# Patient Record
Sex: FEMALE | Race: WHITE | NOT HISPANIC OR LATINO
[De-identification: names, ages, dates, MRNs, and addresses within clinical notes are randomized per-mention and may not be internally consistent; named-entity substitution may affect disease eponyms.]

---

## 2017-09-28 PROBLEM — Z00.00 ENCOUNTER FOR PREVENTIVE HEALTH EXAMINATION: Status: ACTIVE | Noted: 2017-09-28

## 2020-11-28 ENCOUNTER — TRANSCRIPTION ENCOUNTER (OUTPATIENT)
Age: 55
End: 2020-11-28

## 2020-12-12 ENCOUNTER — TRANSCRIPTION ENCOUNTER (OUTPATIENT)
Age: 55
End: 2020-12-12

## 2021-11-18 PROBLEM — Z00.00 ENCOUNTER FOR PREVENTIVE HEALTH EXAMINATION: Noted: 2021-11-18

## 2021-11-19 ENCOUNTER — APPOINTMENT (OUTPATIENT)
Dept: OTOLARYNGOLOGY | Facility: CLINIC | Age: 56
End: 2021-11-19
Payer: COMMERCIAL

## 2021-11-19 VITALS
SYSTOLIC BLOOD PRESSURE: 160 MMHG | BODY MASS INDEX: 27.38 KG/M2 | OXYGEN SATURATION: 97 % | HEIGHT: 61 IN | DIASTOLIC BLOOD PRESSURE: 80 MMHG | WEIGHT: 145 LBS | HEART RATE: 77 BPM

## 2021-11-19 PROCEDURE — 99203 OFFICE O/P NEW LOW 30 MIN: CPT | Mod: 25

## 2021-11-19 PROCEDURE — 69210 REMOVE IMPACTED EAR WAX UNI: CPT | Mod: RT

## 2021-11-19 NOTE — PHYSICAL EXAM
[Binocular Microscopic Exam] : Binocular microscopic exam was performed [FreeTextEntry8] : Full of cerumen.  Removed.  Granuloma see deep to wax.  TM not visualized [FreeTextEntry9] : Mid canl web present.  Granuloma seen deep to the web.  TM not visible. [Midline] : trachea located in midline position [Normal] : no rashes

## 2021-11-19 NOTE — ASSESSMENT
[FreeTextEntry1] : Pt with granulomatous lesions of both ear canals, possibly indicative of an underlying granulomatous disorder. \par \par CT temporal bones ordered.  Pt to see Otology team for additional management.

## 2021-11-19 NOTE — CONSULT LETTER
[Dear  ___] : Dear  [unfilled], [Consult Letter:] : I had the pleasure of evaluating your patient, [unfilled]. [Please see my note below.] : Please see my note below. [Consult Closing:] : Thank you very much for allowing me to participate in the care of this patient.  If you have any questions, please do not hesitate to contact me. [Sincerely,] : Sincerely, [FreeTextEntry2] : Alexander Castillo MD [FreeTextEntry3] : Bunny Doty MD, FACS\par Chief of Otolaryngology [facility]\par  - Dept. of Otolaryngology\par [facility] [employer]\par \par

## 2021-11-19 NOTE — REASON FOR VISIT
[Initial Consultation] : an initial consultation for [FreeTextEntry2] : hearing loss and itchy ears.

## 2021-11-19 NOTE — HISTORY OF PRESENT ILLNESS
[de-identified] : 57 y/o F with a 5 year h/o wax buildup and ear infections.  She has been treated with Ciprodex.  She developed a film on her ear drum that she had removed.  She was later told that she had scar tissue on her ear drum.  She saw another ENT who gave her Acetasol.  It burned her ear a lot.  \par \par Today she is experiencing hearing loss, itchy ears, and pressure sensations in her ears that make her feel like she needs to pop her ears.

## 2021-11-19 NOTE — REVIEW OF SYSTEMS
[Seasonal Allergies] : seasonal allergies [Ear Pain] : ear pain [Ear Itch] : ear itch [Recurrent Ear Infections] : recurrent ear infections [Ear Drainage] : ear drainage [Discolored Nasal Discharge] : discolored nasal discharge [Throat Dryness] : throat dryness [Throat Itching] : throat itching [Negative] : Heme/Lymph

## 2021-11-23 ENCOUNTER — RESULT REVIEW (OUTPATIENT)
Age: 56
End: 2021-11-23

## 2021-11-23 ENCOUNTER — NON-APPOINTMENT (OUTPATIENT)
Age: 56
End: 2021-11-23

## 2021-11-26 ENCOUNTER — APPOINTMENT (OUTPATIENT)
Dept: CT IMAGING | Facility: CLINIC | Age: 56
End: 2021-11-26
Payer: COMMERCIAL

## 2021-11-26 ENCOUNTER — OUTPATIENT (OUTPATIENT)
Dept: OUTPATIENT SERVICES | Facility: HOSPITAL | Age: 56
LOS: 1 days | End: 2021-11-26
Payer: COMMERCIAL

## 2021-11-26 DIAGNOSIS — H93.8X3 OTHER SPECIFIED DISORDERS OF EAR, BILATERAL: ICD-10-CM

## 2021-11-26 PROCEDURE — 70480 CT ORBIT/EAR/FOSSA W/O DYE: CPT

## 2021-11-26 PROCEDURE — 70480 CT ORBIT/EAR/FOSSA W/O DYE: CPT | Mod: 26

## 2021-12-02 ENCOUNTER — APPOINTMENT (OUTPATIENT)
Dept: OTOLARYNGOLOGY | Facility: CLINIC | Age: 56
End: 2021-12-02
Payer: COMMERCIAL

## 2021-12-02 VITALS
WEIGHT: 145 LBS | BODY MASS INDEX: 27.38 KG/M2 | HEIGHT: 61 IN | TEMPERATURE: 98.1 F | HEART RATE: 84 BPM | DIASTOLIC BLOOD PRESSURE: 87 MMHG | SYSTOLIC BLOOD PRESSURE: 168 MMHG

## 2021-12-02 PROCEDURE — 69210 REMOVE IMPACTED EAR WAX UNI: CPT

## 2021-12-02 PROCEDURE — 99214 OFFICE O/P EST MOD 30 MIN: CPT | Mod: 25

## 2021-12-02 NOTE — END OF VISIT
[FreeTextEntry3] : I personally saw and examined NASEEM FAUSTIN in detail. I spoke to ABHINAV Le regarding the assessment and plan of care. I reviewed the above assessment and plan of care, and agree. I have made changes in changes in the body of the note where appropriate.I personally reviewed the HPI, PMH, FH, SH, ROS and medications/allergies. I have spoken to ABHINAV Le regarding the history and have personally determined the assessment and plan of care, and documented this myself. I was present and participated in all key portions of the encounter and all procedures noted above. I have made changes in the body of the note where appropriate.\par \par Attesting Faculty: See Attending Signature Below \par \par \par  [Time Spent: ___ minutes] : I have spent [unfilled] minutes of time on the encounter.

## 2021-12-02 NOTE — PHYSICAL EXAM
[Midline] : trachea located in midline position [de-identified] : bilat wax [de-identified] : bilat thickened TM w/ granulation tissue [Normal] : no rashes

## 2021-12-02 NOTE — ASSESSMENT
[FreeTextEntry1] : Patient referred by  for granulomatous  lesions on both ears. She complains of decreased hearing\par \par Bilat Chronic Myringitis \par -wax cleaned from both ears \par -Rx: Medrol dose pack and Blephamide drops \par -H2O precautions reviewed \par -CT scan reviewed \par Poss bx in OR\par f/u 10 days or prn

## 2021-12-02 NOTE — HISTORY OF PRESENT ILLNESS
[No] : patient does not have a  history of radiation therapy [de-identified] : 55 yo female\par Patient referred by  for granulomatous lesions of both ear canals. Pt has hx of wax buildip and ear infections. She was treated with Ciprodex then developed a film on her ear drum that has been removed by a different ENT. She was also told that she has scar tissue on both ear drums. She is also complains hearing has decreased, she cant hear well. No other modifying factors\par Uses Q-tips\par h/o lichen planus in oral cavity\par \par  [Ear Fullness] : ear fullness [Hearing Loss] : hearing loss [Otalgia] : otalgia [Recurrent Otitis Media] : no recurrent otitis media [Otitis Media with Effusion] : no otitis media with effusion [Eustachian Tube Dysfunction] : no eustachian tube dysfunction [Cholesteatoma] : no cholesteatoma [Early Onset Hearing Loss] : no early onset hearing loss [Stroke] : no stroke [Allergic Rhinitis] : no allergic rhinitis [Adenoidectomy] : no adenoidectomy [Allergies] : no allergies [Asthma] : no asthma [Hyperthyroidism] : no hyperthyroidism [Sialadenitis] : no sialadenitis [Hodgkin Disease] : no hodgkin disease [Non-Hodgkin Lymphoma] : no non-hodgkin lymphoma [None] : No risk factors have been identified. [Graves Disease] : no graves disease [Thyroid Cancer] : no thyroid cancer

## 2021-12-16 ENCOUNTER — APPOINTMENT (OUTPATIENT)
Dept: OTOLARYNGOLOGY | Facility: CLINIC | Age: 56
End: 2021-12-16
Payer: COMMERCIAL

## 2021-12-16 VITALS
BODY MASS INDEX: 27.38 KG/M2 | SYSTOLIC BLOOD PRESSURE: 143 MMHG | WEIGHT: 145 LBS | HEART RATE: 84 BPM | TEMPERATURE: 97.7 F | DIASTOLIC BLOOD PRESSURE: 94 MMHG | HEIGHT: 61 IN

## 2021-12-16 DIAGNOSIS — H93.8X3 OTHER SPECIFIED DISORDERS OF EAR, BILATERAL: ICD-10-CM

## 2021-12-16 PROCEDURE — 99214 OFFICE O/P EST MOD 30 MIN: CPT | Mod: 57

## 2021-12-16 NOTE — ASSESSMENT
[FreeTextEntry1] : Patient presents s/p bilateral chronic myringitis, no improvement in hearing. On examination there is no improvement continues to have  bilateral thickened TM w/ granulation tissue\par \par Bilat Chronic Myringitis and chr thickening of EAC\par h/o lichen planus\par consider canalplasty and tympanoplsty w/ skin graft\par consider HAE vs BAHA\par -Advised to see Dr. Harper or  \par \par \par f/u prn

## 2021-12-16 NOTE — HISTORY OF PRESENT ILLNESS
[No] : patient does not have a  history of radiation therapy [Ear Fullness] : ear fullness [Hearing Loss] : hearing loss [Otalgia] : otalgia [None] : No risk factors have been identified. [de-identified] : 55 yo female\par Patient referred by  for granulomatous lesions of both ear canals. Pt has hx of wax buildip and ear infections. She was treated with Ciprodex then developed a film on her ear drum that has been removed by a different ENT. She was also told that she has scar tissue on both ear drums. She is also complains hearing has decreased, she cant hear well. No other modifying factors\par Uses Q-tips\par h/o lichen planus\par h/o lichen planus in oral cavity\par \par  [FreeTextEntry1] : 12 16/2021Patient presents for follow up s/p bilateral myringitis. She finished Medrol dose pack and using the Blephamide drops. Continues to have clogged ears and decreased hearing.   [Recurrent Otitis Media] : no recurrent otitis media [Otitis Media with Effusion] : no otitis media with effusion [Eustachian Tube Dysfunction] : no eustachian tube dysfunction [Cholesteatoma] : no cholesteatoma [Early Onset Hearing Loss] : no early onset hearing loss [Stroke] : no stroke [Allergic Rhinitis] : no allergic rhinitis [Adenoidectomy] : no adenoidectomy [Allergies] : no allergies [Asthma] : no asthma [Hyperthyroidism] : no hyperthyroidism [Sialadenitis] : no sialadenitis [Hodgkin Disease] : no hodgkin disease [Non-Hodgkin Lymphoma] : no non-hodgkin lymphoma [Graves Disease] : no graves disease [Thyroid Cancer] : no thyroid cancer

## 2021-12-16 NOTE — PHYSICAL EXAM
[Midline] : trachea located in midline position [Normal] : no rashes [de-identified] : stenotiic right EAC [de-identified] : bilat thickened TM w/ granulation tissue

## 2021-12-20 ENCOUNTER — APPOINTMENT (OUTPATIENT)
Dept: OTOLARYNGOLOGY | Facility: CLINIC | Age: 56
End: 2021-12-20

## 2022-01-13 ENCOUNTER — LABORATORY RESULT (OUTPATIENT)
Age: 57
End: 2022-01-13

## 2022-01-13 ENCOUNTER — APPOINTMENT (OUTPATIENT)
Dept: OTOLARYNGOLOGY | Facility: CLINIC | Age: 57
End: 2022-01-13
Payer: COMMERCIAL

## 2022-01-13 VITALS — SYSTOLIC BLOOD PRESSURE: 132 MMHG | DIASTOLIC BLOOD PRESSURE: 78 MMHG

## 2022-01-13 PROCEDURE — 99214 OFFICE O/P EST MOD 30 MIN: CPT | Mod: 25

## 2022-01-13 PROCEDURE — 92567 TYMPANOMETRY: CPT

## 2022-01-13 PROCEDURE — 92557 COMPREHENSIVE HEARING TEST: CPT

## 2022-01-20 ENCOUNTER — NON-APPOINTMENT (OUTPATIENT)
Age: 57
End: 2022-01-20

## 2022-01-21 NOTE — HISTORY OF PRESENT ILLNESS
[de-identified] : 56 yr old referred by Dr. Jovel for Bilat Chronic Myringitis and chronic thickening of EAC -   last audiogram done approx 2 years ago- states she thinks she was told it was OK at the time-feels hearing has decreased over past year.  + chronic itchiness of both ears.  No c/o vertigo and occas ringing in both ears.  Hx of lichen planus in oral cavity - currently on doxycyclin for mouth - using x 2 years -

## 2022-01-21 NOTE — DATA REVIEWED
[de-identified] : Right- -mild to severe, essentially CHL\par Left- -mild to moderate, essentially CHL\par Tymp\par -Type B tympanograms bilaterally,  consistent with conductive pathology\par

## 2022-01-21 NOTE — PHYSICAL EXAM
[Binocular Microscopic Exam] : Binocular microscopic exam was performed [Midline] : trachea located in midline position [Normal] : orientation to person, place, and time: normal [FreeTextEntry8] : wax removed - acquired stenosis AD  [FreeTextEntry9] : wet granulation - no landmarks  - cx done

## 2022-01-24 ENCOUNTER — NON-APPOINTMENT (OUTPATIENT)
Age: 57
End: 2022-01-24

## 2022-01-28 ENCOUNTER — OUTPATIENT (OUTPATIENT)
Dept: OUTPATIENT SERVICES | Facility: HOSPITAL | Age: 57
LOS: 1 days | End: 2022-01-28

## 2022-01-28 VITALS
HEIGHT: 60.5 IN | WEIGHT: 145.06 LBS | HEART RATE: 74 BPM | SYSTOLIC BLOOD PRESSURE: 120 MMHG | DIASTOLIC BLOOD PRESSURE: 80 MMHG | OXYGEN SATURATION: 99 % | TEMPERATURE: 98 F | RESPIRATION RATE: 16 BRPM

## 2022-01-28 DIAGNOSIS — H90.2 CONDUCTIVE HEARING LOSS, UNSPECIFIED: ICD-10-CM

## 2022-01-28 DIAGNOSIS — Z98.891 HISTORY OF UTERINE SCAR FROM PREVIOUS SURGERY: Chronic | ICD-10-CM

## 2022-01-28 DIAGNOSIS — Z98.890 OTHER SPECIFIED POSTPROCEDURAL STATES: Chronic | ICD-10-CM

## 2022-01-28 DIAGNOSIS — K08.89 OTHER SPECIFIED DISORDERS OF TEETH AND SUPPORTING STRUCTURES: ICD-10-CM

## 2022-01-28 DIAGNOSIS — H61.303 ACQUIRED STENOSIS OF EXTERNAL EAR CANAL, UNSPECIFIED, BILATERAL: ICD-10-CM

## 2022-01-28 LAB
ANION GAP SERPL CALC-SCNC: 10 MMOL/L — SIGNIFICANT CHANGE UP (ref 7–14)
BUN SERPL-MCNC: 17 MG/DL — SIGNIFICANT CHANGE UP (ref 7–23)
CALCIUM SERPL-MCNC: 9.4 MG/DL — SIGNIFICANT CHANGE UP (ref 8.4–10.5)
CHLORIDE SERPL-SCNC: 105 MMOL/L — SIGNIFICANT CHANGE UP (ref 98–107)
CO2 SERPL-SCNC: 24 MMOL/L — SIGNIFICANT CHANGE UP (ref 22–31)
CREAT SERPL-MCNC: 0.67 MG/DL — SIGNIFICANT CHANGE UP (ref 0.5–1.3)
GLUCOSE SERPL-MCNC: 115 MG/DL — HIGH (ref 70–99)
HCT VFR BLD CALC: 41.5 % — SIGNIFICANT CHANGE UP (ref 34.5–45)
HGB BLD-MCNC: 13.6 G/DL — SIGNIFICANT CHANGE UP (ref 11.5–15.5)
MCHC RBC-ENTMCNC: 29.2 PG — SIGNIFICANT CHANGE UP (ref 27–34)
MCHC RBC-ENTMCNC: 32.8 GM/DL — SIGNIFICANT CHANGE UP (ref 32–36)
MCV RBC AUTO: 89.2 FL — SIGNIFICANT CHANGE UP (ref 80–100)
NRBC # BLD: 0 /100 WBCS — SIGNIFICANT CHANGE UP
NRBC # FLD: 0 K/UL — SIGNIFICANT CHANGE UP
PLATELET # BLD AUTO: 299 K/UL — SIGNIFICANT CHANGE UP (ref 150–400)
POTASSIUM SERPL-MCNC: 4.1 MMOL/L — SIGNIFICANT CHANGE UP (ref 3.5–5.3)
POTASSIUM SERPL-SCNC: 4.1 MMOL/L — SIGNIFICANT CHANGE UP (ref 3.5–5.3)
RBC # BLD: 4.65 M/UL — SIGNIFICANT CHANGE UP (ref 3.8–5.2)
RBC # FLD: 13.3 % — SIGNIFICANT CHANGE UP (ref 10.3–14.5)
SODIUM SERPL-SCNC: 139 MMOL/L — SIGNIFICANT CHANGE UP (ref 135–145)
WBC # BLD: 5.14 K/UL — SIGNIFICANT CHANGE UP (ref 3.8–10.5)
WBC # FLD AUTO: 5.14 K/UL — SIGNIFICANT CHANGE UP (ref 3.8–10.5)

## 2022-01-28 RX ORDER — SODIUM CHLORIDE 9 MG/ML
1000 INJECTION, SOLUTION INTRAVENOUS
Refills: 0 | Status: DISCONTINUED | OUTPATIENT
Start: 2022-03-31 | End: 2022-04-14

## 2022-01-28 NOTE — H&P PST ADULT - ENMT COMMENTS
preop dx. conductive hearing loss unspecified visibly loose teeth bilateral upper and bilateral lower

## 2022-01-28 NOTE — H&P PST ADULT - PROBLEM SELECTOR PLAN 2
Pt instructed to obtain dental eval preop, pt able to verbalize understanding and surgeon notified via email.

## 2022-01-28 NOTE — H&P PST ADULT - NSICDXFAMILYHX_GEN_ALL_CORE_FT
FAMILY HISTORY:  Mother  Still living? No  FH: heart disease, Age at diagnosis: Age Unknown    Sibling  Still living? Yes, Estimated age: Age Unknown  FH: diabetes mellitus, Age at diagnosis: Age Unknown  FH: heart disease, Age at diagnosis: Age Unknown

## 2022-01-28 NOTE — H&P PST ADULT - PROBLEM SELECTOR PLAN 1
Pt is scheduled for right canalplasty, possible tympanoplasty with split thickness skin graft on 2/9/22.  Verbal and written pre op instructions reviewed with patient and pt able to verbalize understanding. Pt instructed to follow surgeon's guidelines regarding COVID testing preop.

## 2022-01-28 NOTE — H&P PST ADULT - NEGATIVE GENERAL GENITOURINARY SYMPTOMS
no hematuria/no flank pain L/no bladder infections/no incontinence/no dysuria no hematuria/no flank pain L/no flank pain R/no bladder infections/no incontinence/no dysuria

## 2022-01-28 NOTE — H&P PST ADULT - NSICDXPASTMEDICALHX_GEN_ALL_CORE_FT
PAST MEDICAL HISTORY:  Conductive hearing loss, unspecified     Oral lichen planus x 2 years, was taking doxycycline, stopped 2 weeks ago

## 2022-01-28 NOTE — H&P PST ADULT - HISTORY OF PRESENT ILLNESS
55 yo female with preop dx. conductive hearing loss unspecified presents to pre surgical testing.  Pt reports chronic ear infections with some bilateral hearing loss x 1 year.  Pt s/p CT revealing stenosis.  Pt is scheduled for right canalplasty, possible tympanoplasty with split thickness skin graft.

## 2022-03-17 ENCOUNTER — OUTPATIENT (OUTPATIENT)
Dept: OUTPATIENT SERVICES | Facility: HOSPITAL | Age: 57
LOS: 1 days | End: 2022-03-17

## 2022-03-17 VITALS
TEMPERATURE: 98 F | HEART RATE: 76 BPM | DIASTOLIC BLOOD PRESSURE: 86 MMHG | RESPIRATION RATE: 16 BRPM | HEIGHT: 61 IN | WEIGHT: 145.95 LBS | SYSTOLIC BLOOD PRESSURE: 140 MMHG | OXYGEN SATURATION: 98 %

## 2022-03-17 DIAGNOSIS — H91.90 UNSPECIFIED HEARING LOSS, UNSPECIFIED EAR: ICD-10-CM

## 2022-03-17 DIAGNOSIS — H90.2 CONDUCTIVE HEARING LOSS, UNSPECIFIED: ICD-10-CM

## 2022-03-17 DIAGNOSIS — Z98.891 HISTORY OF UTERINE SCAR FROM PREVIOUS SURGERY: Chronic | ICD-10-CM

## 2022-03-17 DIAGNOSIS — Z98.890 OTHER SPECIFIED POSTPROCEDURAL STATES: Chronic | ICD-10-CM

## 2022-03-17 LAB
ANION GAP SERPL CALC-SCNC: 13 MMOL/L — SIGNIFICANT CHANGE UP (ref 7–14)
BUN SERPL-MCNC: 15 MG/DL — SIGNIFICANT CHANGE UP (ref 7–23)
CALCIUM SERPL-MCNC: 9.6 MG/DL — SIGNIFICANT CHANGE UP (ref 8.4–10.5)
CHLORIDE SERPL-SCNC: 104 MMOL/L — SIGNIFICANT CHANGE UP (ref 98–107)
CO2 SERPL-SCNC: 21 MMOL/L — LOW (ref 22–31)
CREAT SERPL-MCNC: 0.63 MG/DL — SIGNIFICANT CHANGE UP (ref 0.5–1.3)
EGFR: 103 ML/MIN/1.73M2 — SIGNIFICANT CHANGE UP
GLUCOSE SERPL-MCNC: 119 MG/DL — HIGH (ref 70–99)
HCT VFR BLD CALC: 41 % — SIGNIFICANT CHANGE UP (ref 34.5–45)
HGB BLD-MCNC: 13.3 G/DL — SIGNIFICANT CHANGE UP (ref 11.5–15.5)
MCHC RBC-ENTMCNC: 29 PG — SIGNIFICANT CHANGE UP (ref 27–34)
MCHC RBC-ENTMCNC: 32.4 GM/DL — SIGNIFICANT CHANGE UP (ref 32–36)
MCV RBC AUTO: 89.5 FL — SIGNIFICANT CHANGE UP (ref 80–100)
NRBC # BLD: 0 /100 WBCS — SIGNIFICANT CHANGE UP
NRBC # FLD: 0 K/UL — SIGNIFICANT CHANGE UP
PLATELET # BLD AUTO: 293 K/UL — SIGNIFICANT CHANGE UP (ref 150–400)
POTASSIUM SERPL-MCNC: 4.4 MMOL/L — SIGNIFICANT CHANGE UP (ref 3.5–5.3)
POTASSIUM SERPL-SCNC: 4.4 MMOL/L — SIGNIFICANT CHANGE UP (ref 3.5–5.3)
RBC # BLD: 4.58 M/UL — SIGNIFICANT CHANGE UP (ref 3.8–5.2)
RBC # FLD: 13.5 % — SIGNIFICANT CHANGE UP (ref 10.3–14.5)
SODIUM SERPL-SCNC: 138 MMOL/L — SIGNIFICANT CHANGE UP (ref 135–145)
WBC # BLD: 5.83 K/UL — SIGNIFICANT CHANGE UP (ref 3.8–10.5)
WBC # FLD AUTO: 5.83 K/UL — SIGNIFICANT CHANGE UP (ref 3.8–10.5)

## 2022-03-17 RX ORDER — SODIUM CHLORIDE 9 MG/ML
1000 INJECTION, SOLUTION INTRAVENOUS
Refills: 0 | Status: DISCONTINUED | OUTPATIENT
Start: 2022-03-31 | End: 2022-04-14

## 2022-03-17 NOTE — H&P PST ADULT - PROBLEM SELECTOR PLAN 1
Right canalplasty, possible tympanoplasty with split thickness skin graft.      CBC BMP    Prop instruction given and explained

## 2022-03-17 NOTE — H&P PST ADULT - NSICDXPASTMEDICALHX_GEN_ALL_CORE_FT
PAST MEDICAL HISTORY:  Conductive hearing loss, unspecified     Oral lichen planus x 2 years, was taking doxycycline, completed

## 2022-03-17 NOTE — H&P PST ADULT - ENMT COMMENTS
preop dx. conductive hearing loss unspecified presents to pre surgical testing.  Pt reports chronic ear infections with some bilateral hearing loss x 1 year.  Pt s/p CT revealing stenosis.  Pt is scheduled for right canalplasty, possible tympanoplasty with split thickness skin graft. hearing impairment

## 2022-03-21 ENCOUNTER — APPOINTMENT (OUTPATIENT)
Dept: OTOLARYNGOLOGY | Facility: CLINIC | Age: 57
End: 2022-03-21
Payer: COMMERCIAL

## 2022-03-21 VITALS
WEIGHT: 148 LBS | HEIGHT: 61 IN | DIASTOLIC BLOOD PRESSURE: 81 MMHG | SYSTOLIC BLOOD PRESSURE: 136 MMHG | HEART RATE: 83 BPM | BODY MASS INDEX: 27.94 KG/M2

## 2022-03-21 PROCEDURE — 99213 OFFICE O/P EST LOW 20 MIN: CPT

## 2022-03-21 RX ORDER — SULFACETAMIDE SODIUM AND PREDNISOLONE SODIUM PHOSPHATE 100; 2.3 MG/ML; MG/ML
10-0.23 SOLUTION/ DROPS OPHTHALMIC
Qty: 5 | Refills: 0 | Status: DISCONTINUED | COMMUNITY
Start: 2021-12-02 | End: 2022-03-21

## 2022-03-21 RX ORDER — METHYLPREDNISOLONE 4 MG/1
4 TABLET ORAL
Qty: 1 | Refills: 0 | Status: DISCONTINUED | COMMUNITY
Start: 2021-12-02 | End: 2022-03-21

## 2022-03-30 ENCOUNTER — TRANSCRIPTION ENCOUNTER (OUTPATIENT)
Age: 57
End: 2022-03-30

## 2022-03-30 ENCOUNTER — APPOINTMENT (OUTPATIENT)
Dept: OTOLARYNGOLOGY | Facility: CLINIC | Age: 57
End: 2022-03-30
Payer: COMMERCIAL

## 2022-03-30 VITALS
HEART RATE: 86 BPM | DIASTOLIC BLOOD PRESSURE: 91 MMHG | BODY MASS INDEX: 27.94 KG/M2 | SYSTOLIC BLOOD PRESSURE: 165 MMHG | WEIGHT: 148 LBS | HEIGHT: 61 IN

## 2022-03-30 PROCEDURE — 99213 OFFICE O/P EST LOW 20 MIN: CPT | Mod: 25

## 2022-03-30 PROCEDURE — 92567 TYMPANOMETRY: CPT

## 2022-03-30 PROCEDURE — 92557 COMPREHENSIVE HEARING TEST: CPT

## 2022-03-30 NOTE — ASU PATIENT PROFILE, ADULT - NSICDXPASTSURGICALHX_GEN_ALL_CORE_FT
PVD OU:  Patient was cautioned to call our office immediately if they experience a substantial change in their symptoms such as an increase in floaters persistent flashes loss of visual field (may appear as a shadow or a curtain) or decrease in visual acuity as these may indicate a retinal tear or detachment.   If this is a new problem patient will need to return for re-examination  as determined by the physician PAST SURGICAL HISTORY:  H/O ovarian cystectomy age 41    History of

## 2022-03-30 NOTE — PHYSICAL EXAM
[Midline] : trachea located in midline position [Normal] : orientation to person, place, and time: normal [de-identified] : no landmarks AU but left filled with blood and granulaiton

## 2022-03-30 NOTE — ASU PATIENT PROFILE, ADULT - FALL HARM RISK - UNIVERSAL INTERVENTIONS
Bed in lowest position, wheels locked, appropriate side rails in place/Call bell, personal items and telephone in reach/Instruct patient to call for assistance before getting out of bed or chair/Non-slip footwear when patient is out of bed/[place] to call system/Physically safe environment - no spills, clutter or unnecessary equipment/Purposeful Proactive Rounding/Room/bathroom lighting operational, light cord in reach

## 2022-03-30 NOTE — HISTORY OF PRESENT ILLNESS
[de-identified] : 55 yo F with CHL and b/l acquired stenosis presents for follow up. No noticeable change in hearing. Occasional has otorrhea in left ear - none currently. No tinnitus, otalgia, ear infections, dizziness or headaches.

## 2022-03-31 ENCOUNTER — RESULT REVIEW (OUTPATIENT)
Age: 57
End: 2022-03-31

## 2022-03-31 ENCOUNTER — TRANSCRIPTION ENCOUNTER (OUTPATIENT)
Age: 57
End: 2022-03-31

## 2022-03-31 ENCOUNTER — APPOINTMENT (OUTPATIENT)
Dept: OTOLARYNGOLOGY | Facility: HOSPITAL | Age: 57
End: 2022-03-31

## 2022-03-31 ENCOUNTER — OUTPATIENT (OUTPATIENT)
Dept: OUTPATIENT SERVICES | Facility: HOSPITAL | Age: 57
LOS: 1 days | Discharge: HOME | End: 2022-03-31
Payer: COMMERCIAL

## 2022-03-31 VITALS
HEART RATE: 80 BPM | OXYGEN SATURATION: 95 % | DIASTOLIC BLOOD PRESSURE: 59 MMHG | SYSTOLIC BLOOD PRESSURE: 109 MMHG | RESPIRATION RATE: 18 BRPM

## 2022-03-31 VITALS
SYSTOLIC BLOOD PRESSURE: 134 MMHG | OXYGEN SATURATION: 98 % | WEIGHT: 145.95 LBS | HEART RATE: 74 BPM | HEIGHT: 61 IN | DIASTOLIC BLOOD PRESSURE: 77 MMHG | TEMPERATURE: 98 F | RESPIRATION RATE: 17 BRPM

## 2022-03-31 DIAGNOSIS — Z98.891 HISTORY OF UTERINE SCAR FROM PREVIOUS SURGERY: Chronic | ICD-10-CM

## 2022-03-31 DIAGNOSIS — H90.2 CONDUCTIVE HEARING LOSS, UNSPECIFIED: ICD-10-CM

## 2022-03-31 DIAGNOSIS — Z98.890 OTHER SPECIFIED POSTPROCEDURAL STATES: Chronic | ICD-10-CM

## 2022-03-31 PROCEDURE — 92516 FACIAL NERVE FUNCTION TEST: CPT

## 2022-03-31 PROCEDURE — 88305 TISSUE EXAM BY PATHOLOGIST: CPT | Mod: 26

## 2022-03-31 PROCEDURE — 69150 EXTENSIVE EAR CANAL SURGERY: CPT | Mod: LT

## 2022-03-31 PROCEDURE — 15120 SPLT AGRFT F/S/N/H/F/G/M 1ST: CPT

## 2022-03-31 RX ORDER — METOCLOPRAMIDE HCL 10 MG
10 TABLET ORAL ONCE
Refills: 0 | Status: COMPLETED | OUTPATIENT
Start: 2022-03-31 | End: 2022-03-31

## 2022-03-31 RX ORDER — OXYCODONE HYDROCHLORIDE 5 MG/1
5 TABLET ORAL EVERY 6 HOURS
Refills: 0 | Status: DISCONTINUED | OUTPATIENT
Start: 2022-03-31 | End: 2022-03-31

## 2022-03-31 RX ORDER — CEFDINIR 250 MG/5ML
1 POWDER, FOR SUSPENSION ORAL
Qty: 14 | Refills: 0
Start: 2022-03-31 | End: 2022-04-06

## 2022-03-31 RX ORDER — OXYCODONE AND ACETAMINOPHEN 5; 325 MG/1; MG/1
1 TABLET ORAL
Qty: 20 | Refills: 0
Start: 2022-03-31 | End: 2022-04-04

## 2022-03-31 RX ORDER — SODIUM CHLORIDE 9 MG/ML
500 INJECTION, SOLUTION INTRAVENOUS ONCE
Refills: 0 | Status: COMPLETED | OUTPATIENT
Start: 2022-03-31 | End: 2022-03-31

## 2022-03-31 RX ORDER — FENTANYL CITRATE 50 UG/ML
25 INJECTION INTRAVENOUS
Refills: 0 | Status: DISCONTINUED | OUTPATIENT
Start: 2022-03-31 | End: 2022-03-31

## 2022-03-31 RX ORDER — ONDANSETRON 8 MG/1
4 TABLET, FILM COATED ORAL ONCE
Refills: 0 | Status: COMPLETED | OUTPATIENT
Start: 2022-03-31 | End: 2022-03-31

## 2022-03-31 RX ORDER — HYDROMORPHONE HYDROCHLORIDE 2 MG/ML
0.5 INJECTION INTRAMUSCULAR; INTRAVENOUS; SUBCUTANEOUS EVERY 6 HOURS
Refills: 0 | Status: DISCONTINUED | OUTPATIENT
Start: 2022-03-31 | End: 2022-03-31

## 2022-03-31 RX ORDER — ACETAMINOPHEN 500 MG
650 TABLET ORAL EVERY 6 HOURS
Refills: 0 | Status: DISCONTINUED | OUTPATIENT
Start: 2022-03-31 | End: 2022-04-14

## 2022-03-31 RX ADMIN — SODIUM CHLORIDE 1000 MILLILITER(S): 9 INJECTION, SOLUTION INTRAVENOUS at 13:13

## 2022-03-31 RX ADMIN — Medication 200 MILLIGRAM(S): at 14:52

## 2022-03-31 RX ADMIN — SODIUM CHLORIDE 30 MILLILITER(S): 9 INJECTION, SOLUTION INTRAVENOUS at 12:39

## 2022-03-31 RX ADMIN — ONDANSETRON 4 MILLIGRAM(S): 8 TABLET, FILM COATED ORAL at 12:38

## 2022-03-31 RX ADMIN — Medication 10 MILLIGRAM(S): at 13:13

## 2022-03-31 NOTE — ASU DISCHARGE PLAN (ADULT/PEDIATRIC) - CARE PROVIDER_API CALL
Tone Cox)  Otolaryngology  [address] [place] [address]  Phone: [phone]  Fax: [phone]  Follow Up Time:

## 2022-03-31 NOTE — ASU DISCHARGE PLAN (ADULT/PEDIATRIC) - NS MD DC FALL RISK RISK
For information on Fall & Injury Prevention, visit: [email or web address] OR  [email or web address] OR  https://www.cdc.gov/steadi/patient.html

## 2022-03-31 NOTE — ASU DISCHARGE PLAN (ADULT/PEDIATRIC) - ASU DC SPECIAL INSTRUCTIONSFT
*KEEP LEFT EAR dressing on at all times. Keep LEFT EAR DRY at all times (no water)  Follow up in one week with Dr Cox  [facility]ib[facility] and [facility]    Activity: No heavy lifting or strenuous activity. Walk as tolerated. Physical therapy per routine.     No baths, hot tubs or swimming until approved by your surgeon. Call office for appointment.  Office: [phone].  Call office for fever >101.5 or redness or drainage from wound. *KEEP LEFT EAR dressing on at all times. Keep LEFT EAR DRY at all times (no water)  Follow up in one week with Dr Cox  [facility]ib[facility]  OTC tylemol 650mg Q6 hours for pain as needed    Activity: No heavy lifting or strenuous activity. Walk as tolerated. Physical therapy per routine.     No baths, hot tubs or swimming until approved by your surgeon. Call office for appointment.  Office: [phone].  Call office for fever >101.5 or redness or drainage from wound. *KEEP LEFT EAR dressing on at all times. Keep LEFT EAR DRY at all times (no water)  Follow up in one week with Dr Cox  [facility]ib[facility]  OTC tylenol 650mg Q6 hours for pain as needed    Activity: No heavy lifting or strenuous activity. Walk as tolerated. Physical therapy per routine.     No baths, hot tubs or swimming until approved by your surgeon. Call office for appointment.  Office: [phone].  Call office for fever >101.5 or redness or drainage from wound.

## 2022-03-31 NOTE — ASU DISCHARGE PLAN (ADULT/PEDIATRIC) - FOLLOW UP APPOINTMENTS
may also call Recovery Room (PACU) 24/7 @ [phone]/[facility]

## 2022-03-31 NOTE — ASU DISCHARGE PLAN (ADULT/PEDIATRIC) - NURSING INSTRUCTIONS
You received IV Tylenol for pain management at _1130__. Please DO NOT take any Tylenol (Acetaminophen) containing products, such as Vicodin, Percocet, Excedrin, and cold medications for the next 6 hours (until ___530 PM). DO NOT TAKE MORE THAN 3000 MG OF TYLENOL in a 24 hour period.

## 2022-04-07 ENCOUNTER — APPOINTMENT (OUTPATIENT)
Dept: OTOLARYNGOLOGY | Facility: CLINIC | Age: 57
End: 2022-04-07
Payer: COMMERCIAL

## 2022-04-07 PROCEDURE — 99024 POSTOP FOLLOW-UP VISIT: CPT

## 2022-04-11 LAB — SURGICAL PATHOLOGY STUDY: SIGNIFICANT CHANGE UP

## 2022-04-21 NOTE — PHYSICAL EXAM
[FreeTextEntry1] : arm dressing replaced - wound as expected [de-identified] : packing removed - skin grafts taking

## 2022-04-21 NOTE — PHYSICAL EXAM
[Midline] : trachea located in midline position [Normal] : orientation to person, place, and time: normal [de-identified] : no landmarks AU but left infection now resolved

## 2022-04-21 NOTE — HISTORY OF PRESENT ILLNESS
[de-identified] : Recently had left granulation and blood - here to determine if surgery can proceed on left

## 2022-04-21 NOTE — DATA REVIEWED
[de-identified] : Essentially Moderate to moderately severe conductive HL, Au.\par Type B tymp, Au.

## 2022-04-27 ENCOUNTER — APPOINTMENT (OUTPATIENT)
Dept: OTOLARYNGOLOGY | Facility: CLINIC | Age: 57
End: 2022-04-27
Payer: COMMERCIAL

## 2022-04-27 VITALS
DIASTOLIC BLOOD PRESSURE: 96 MMHG | SYSTOLIC BLOOD PRESSURE: 149 MMHG | BODY MASS INDEX: 27.38 KG/M2 | WEIGHT: 145 LBS | HEIGHT: 61 IN | HEART RATE: 82 BPM

## 2022-04-27 PROCEDURE — 99024 POSTOP FOLLOW-UP VISIT: CPT

## 2022-04-27 PROCEDURE — 92557 COMPREHENSIVE HEARING TEST: CPT

## 2022-05-30 NOTE — HISTORY OF PRESENT ILLNESS
[de-identified] : 57 yo F with history of bilateral acquired stenosis now S/p left canal plasty with STSG 3/31/22 presents for follow up. Reports improved hearing on left. No tinnitus, otalgia, otorrhea, ear infections, dizziness or headaches.

## 2022-06-29 ENCOUNTER — APPOINTMENT (OUTPATIENT)
Dept: OTOLARYNGOLOGY | Facility: CLINIC | Age: 57
End: 2022-06-29

## 2022-06-29 VITALS
WEIGHT: 145 LBS | HEART RATE: 88 BPM | DIASTOLIC BLOOD PRESSURE: 82 MMHG | SYSTOLIC BLOOD PRESSURE: 134 MMHG | BODY MASS INDEX: 27.38 KG/M2 | HEIGHT: 61 IN

## 2022-06-29 DIAGNOSIS — H61.23 IMPACTED CERUMEN, BILATERAL: ICD-10-CM

## 2022-06-29 PROCEDURE — 99214 OFFICE O/P EST MOD 30 MIN: CPT

## 2022-06-29 PROCEDURE — 92557 COMPREHENSIVE HEARING TEST: CPT

## 2022-06-29 PROCEDURE — 92567 TYMPANOMETRY: CPT

## 2022-06-29 RX ORDER — TRAMADOL HYDROCHLORIDE 50 MG/1
50 TABLET, COATED ORAL
Qty: 15 | Refills: 0 | Status: DISCONTINUED | COMMUNITY
Start: 2022-02-07

## 2022-06-29 RX ORDER — CIPROFLOXACIN AND DEXAMETHASONE 3; 1 MG/ML; MG/ML
0.3-0.1 SUSPENSION/ DROPS AURICULAR (OTIC)
Qty: 2 | Refills: 2 | Status: DISCONTINUED | COMMUNITY
Start: 2022-04-07 | End: 2022-06-29

## 2022-06-30 PROBLEM — H61.23 EXCESSIVE EAR WAX, BILATERAL: Status: ACTIVE | Noted: 2021-12-02

## 2022-06-30 NOTE — PHYSICAL EXAM
[Midline] : trachea located in midline position [Normal] : orientation to person, place, and time: normal [FreeTextEntry1] : arm healing [de-identified] : EAC patent, TM visible AS - wax removed AU - blind sac AD

## 2022-06-30 NOTE — DATA REVIEWED
[de-identified] : Right -mild to severe CHL\par Left -mild to moderate CHL\par Tymp\par Right -Type B tympanogram consistent with conductive pathology\par Left - normal Type A tympanogram

## 2022-06-30 NOTE — REASON FOR VISIT
[Subsequent Evaluation] : a subsequent evaluation for [FreeTextEntry2] : acquired stenosis of bilateral external ear canal

## 2022-06-30 NOTE — HISTORY OF PRESENT ILLNESS
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 History of tympanic CHL.  Patient reports doing well, hearing greatly improved of Left, inquiring about surgical options for Right ear.  Denies otalgia, otorrhea, tinnitus, dizziness, vertigo, headaches related to ears, recent fevers and ear infections.

## 2022-07-18 PROBLEM — L43.8 OTHER LICHEN PLANUS: Chronic | Status: ACTIVE | Noted: 2022-01-28

## 2022-07-18 PROBLEM — H90.2 CONDUCTIVE HEARING LOSS, UNSPECIFIED: Chronic | Status: ACTIVE | Noted: 2022-01-28

## 2022-08-31 ENCOUNTER — OUTPATIENT (OUTPATIENT)
Dept: OUTPATIENT SERVICES | Facility: HOSPITAL | Age: 57
LOS: 1 days | End: 2022-08-31

## 2022-08-31 VITALS
HEART RATE: 67 BPM | OXYGEN SATURATION: 98 % | DIASTOLIC BLOOD PRESSURE: 74 MMHG | HEIGHT: 60 IN | SYSTOLIC BLOOD PRESSURE: 132 MMHG | TEMPERATURE: 98 F | WEIGHT: 139.99 LBS | RESPIRATION RATE: 16 BRPM

## 2022-08-31 DIAGNOSIS — Z98.891 HISTORY OF UTERINE SCAR FROM PREVIOUS SURGERY: Chronic | ICD-10-CM

## 2022-08-31 DIAGNOSIS — H61.303 ACQUIRED STENOSIS OF EXTERNAL EAR CANAL, UNSPECIFIED, BILATERAL: ICD-10-CM

## 2022-08-31 DIAGNOSIS — H90.2 CONDUCTIVE HEARING LOSS, UNSPECIFIED: ICD-10-CM

## 2022-08-31 DIAGNOSIS — H90.2 CONDUCTIVE HEARING LOSS, UNSPECIFIED: Chronic | ICD-10-CM

## 2022-08-31 DIAGNOSIS — Z01.812 ENCOUNTER FOR PREPROCEDURAL LABORATORY EXAMINATION: ICD-10-CM

## 2022-08-31 DIAGNOSIS — Z98.890 OTHER SPECIFIED POSTPROCEDURAL STATES: Chronic | ICD-10-CM

## 2022-08-31 LAB
ANION GAP SERPL CALC-SCNC: 9 MMOL/L — SIGNIFICANT CHANGE UP (ref 7–14)
BUN SERPL-MCNC: 15 MG/DL — SIGNIFICANT CHANGE UP (ref 7–23)
CALCIUM SERPL-MCNC: 9.8 MG/DL — SIGNIFICANT CHANGE UP (ref 8.4–10.5)
CHLORIDE SERPL-SCNC: 104 MMOL/L — SIGNIFICANT CHANGE UP (ref 98–107)
CO2 SERPL-SCNC: 27 MMOL/L — SIGNIFICANT CHANGE UP (ref 22–31)
CREAT SERPL-MCNC: 0.67 MG/DL — SIGNIFICANT CHANGE UP (ref 0.5–1.3)
EGFR: 101 ML/MIN/1.73M2 — SIGNIFICANT CHANGE UP
GLUCOSE SERPL-MCNC: 63 MG/DL — LOW (ref 70–99)
HCT VFR BLD CALC: 41.6 % — SIGNIFICANT CHANGE UP (ref 34.5–45)
HGB BLD-MCNC: 13.5 G/DL — SIGNIFICANT CHANGE UP (ref 11.5–15.5)
MCHC RBC-ENTMCNC: 29.3 PG — SIGNIFICANT CHANGE UP (ref 27–34)
MCHC RBC-ENTMCNC: 32.5 GM/DL — SIGNIFICANT CHANGE UP (ref 32–36)
MCV RBC AUTO: 90.4 FL — SIGNIFICANT CHANGE UP (ref 80–100)
NRBC # BLD: 0 /100 WBCS — SIGNIFICANT CHANGE UP (ref 0–0)
NRBC # FLD: 0 K/UL — SIGNIFICANT CHANGE UP (ref 0–0)
PLATELET # BLD AUTO: 308 K/UL — SIGNIFICANT CHANGE UP (ref 150–400)
POTASSIUM SERPL-MCNC: 4.7 MMOL/L — SIGNIFICANT CHANGE UP (ref 3.5–5.3)
POTASSIUM SERPL-SCNC: 4.7 MMOL/L — SIGNIFICANT CHANGE UP (ref 3.5–5.3)
RBC # BLD: 4.6 M/UL — SIGNIFICANT CHANGE UP (ref 3.8–5.2)
RBC # FLD: 13.2 % — SIGNIFICANT CHANGE UP (ref 10.3–14.5)
SODIUM SERPL-SCNC: 140 MMOL/L — SIGNIFICANT CHANGE UP (ref 135–145)
WBC # BLD: 5.88 K/UL — SIGNIFICANT CHANGE UP (ref 3.8–10.5)
WBC # FLD AUTO: 5.88 K/UL — SIGNIFICANT CHANGE UP (ref 3.8–10.5)

## 2022-08-31 RX ORDER — THIAMINE MONONITRATE (VIT B1) 100 MG
1 TABLET ORAL
Qty: 0 | Refills: 0 | DISCHARGE

## 2022-08-31 RX ORDER — CHOLECALCIFEROL (VITAMIN D3) 125 MCG
1 CAPSULE ORAL
Qty: 0 | Refills: 0 | DISCHARGE

## 2022-08-31 NOTE — H&P PST ADULT - NECK
[facility]cer [place] at [facility] 
[address], [address] [address] W: [phone]  F: [phone] Reason for Visit:  
Gricelda Francis is a 68 y.o. male who is seen in hospital consultation at the request of Dr. Geovany Bird for hx of melanoma. Treatment History: · Bladder cancer dx in early 1990s · BCC, left neck, s/p Electrodesiccation and Curettage, 06/07/12 · SCCi, right arm, s/p Electrodesiccation and Curettage, 12/2014 · Recurrent BCC, left lateral neck, Mohs, 02/24/15 · M Melanoma, mid abdomen, Breslow depth 0.65 mm, wide excision by Dr. Hien Kay, negative right axillary and right inguinal SLN biopsies, 12/20/17 · Recurrent melanoma 12/2019, 
· CAT CAP and MRI head negative 12/19 · STRATA · No BRAF mutation · CDKN2A deep deletion · NRAS p.Q61R 56% · Opdivo 480 q 28 x 12; cycle 1 1/7/20, cycle 2 2/4/20 · Recurrence at surgical site 2/28/20 · Plan to add Ipilimumab -3/6/20 Ipi 3 mg/kg and opdivo 1 mg/kg q 3 weeks x 4 then opdivo History of Present Illness: This is a 68 y.o. male with a history of BCC, SCC, and malignant melanoma who is now admitted for diarrhea/ GI bleed. Patient reports having 5-6 bowel movents per day and recently in the last two days having 5-6 blood stools that are dark red. During the workup, CT C/A/P performed which shows possible metastatic disease to lung, liver, mesenteric node, and bone. He has received two cycles of nivolumab and one cycle of ipilumomab/nivolumab. Discussed CT results with patient. Oncologic Hx: 
Patient has a history of malignant melanoma of his abdomen in 2017. He sees surgical dermatology regularly due to his history. He noticed a lump on his right abdomen which grew quickly. Patient denies discoloration to the skin around the lump. He saw his PCP who ordered an [address] whichw as worrisome for malignancy.  He was seen by Dr. Coral Rice who performed an excisional biopsy of the RUQ abdominal mass. Pathology shows melanoma. Patient was referred to us for evaluation and treatment. Patient has recently lost 10 lbs. He relates this to having food poisoning over Thanksgiving. We went over the results of the CT scan and the MRI of his head which were all negative. 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 All known melanoma has been resected at this point. No family history of melanoma. His only family history of cancer is a brain tumor in his grandmother when she was in her 80s. Patient did have bladder cancer in the early 1990s. He does continue to get cystoscopies. He is a former smoker. His colonoscopy is up to date. The patient has had some diarrhea after having his first course of Yervoy. For that reason on steroids to see if we could go ahead and blunt any diarrhea the diarrhea had come on very quickly after [facility] suggesting that it probably was not Yervoy related but nonetheless we treated him with steroids. Despite steroids and hydration on Friday he is continued to do poorly over the weekend and presented to the emergency room and shown a marked decline. The patient did have a CT of his chest and abdomen in the emergency room which now shows pulmonary metastases as well as intra-abdominal metastasis and bony destruction. I spoke with the patient about the results of the CT and what it means and that his time is now getting very short. Having more difficulty with his breathing. He is very weak. He is gone from a performance status of 1 down to a performance status of 4 and just 3 days.   The patient will need a palliative referral and probably a hospice referral.  With the diarrhea it would be very reasonable to consider a colonoscopy or flexible sigmoidoscopy with biopsies and possible infliximab if the gastroenterologist felt that it was [facility] induced Past Medical History:  
Diagnosis Date  Abdominal wall mass of right upper quadrant 2019  Atrial fibrillation ([facility] [address]  BPH (benign prostatic hyperplasia)  Cancer ([address] early 36s BLADDER  
 Hypercholesterolemia  Hypertension  Joint replaced   
 right knee  Skin cancer  Skin disorder 2018 Skin Cancer - melanoma across stomach, lymphnode involvement in Right armpit and Right groin  Sun-damaged skin Past Surgical History:  
Procedure Laterality Date  HX AFIB ABLATION  2018  HX APPENDECTOMY [address] [facility]  HX KNEE REPLACEMENT    
 right knee  HX KNEE REPLACEMENT  2019  
 left knee  HX MALIGNANT SKIN LESION EXCISION    
 HX OTHER SURGICAL  2019 Excisional biopsy of right upper quadrant abdominal wall mass.  HX TONSILLECTOMY  HX UROLOGICAL CYSTOSCOPY  SKIN TISSUE PROCEDURE UNLISTED   Melanoma across abdomen, Right armpit and groin lymphnode involvement Social History Tobacco Use  Smoking status: Former Smoker Packs/day: 2.00 Years: 20.00 Pack years: 40.00 Last attempt to quit: 1988 Years since quittin.2  Smokeless tobacco: Never Used Substance Use Topics  Alcohol use: Yes Alcohol/week: 2.0 standard drinks Types: 2 Glasses of wine per week Comment: 1 glass with dinner 2 nights per week Family History Problem Relation Age of Onset  Dementia Mother  Hypertension Father  Hypertension Sister Current Facility-Administered Medications Medication Dose Route Frequency  sodium chloride (NS) flush 5-40 mL  5-40 mL IntraVENous Q8H  
 sodium chloride (NS) flush 5-40 mL  5-40 mL IntraVENous PRN  
 acetaminophen (TYLENOL) tablet 650 mg  650 mg Oral Q4H PRN  
 oxyCODONE-acetaminophen (PERCOCET) 5-325 mg per tablet 1 Tab  1 Tab Oral Q4H PRN  
  morphine injection 1 mg  1 mg IntraVENous Q4H PRN  
 diphenhydrAMINE (BENADRYL) injection 12.5 mg  12.5 mg IntraVENous Q4H PRN  
 ondansetron (ZOFRAN) injection 4 mg  4 mg IntraVENous Q4H PRN  
 . PHARMACY TO SUBSTITUTE PER PROTOCOL (Reordered from: cholestyramine (Questran) 4 gram packet)    Per Protocol  [START ON 3/31/2020] levothyroxine (SYNTHROID) tablet 75 mcg  75 mcg Oral 6am  
 [START ON 3/31/2020] metoprolol succinate (TOPROL-XL) XL tablet 75 mg  75 mg Oral DAILY  primidone (MYSOLINE) tablet 50 mg  50 mg Oral QHS  atorvastatin (LIPITOR) tablet 20 mg  20 mg Oral QHS  [START ON 3/31/2020] tamsulosin (FLOMAX) capsule 0.4 mg  0.4 mg Oral DAILY  temazepam (RESTORIL) capsule 30 mg  30 mg Oral QHS PRN  
 vancomycin (FIRVANQ) 50 mg/mL oral solution 125 mg  125 mg Oral Q6H  
 dextrose 5% and 0.9% NaCl infusion  75 mL/hr IntraVENous CONTINUOUS Current Outpatient Medications Medication Sig  predniSONE (DELTASONE) 20 mg tablet Take 60 mg by mouth daily for 10 days.  cholestyramine (Questran) 4 gram packet Take 1 Packet by mouth three (3) times daily (with meals) for 10 days.  temazepam (RESTORIL) 30 mg capsule Take 1 Cap by mouth nightly as needed for Sleep for up to 30 days. Max Daily Amount: 30 mg.  
 levoFLOXacin (LEVAQUIN) 750 mg tablet Take 1 Tab by mouth daily.  levothyroxine (SYNTHROID) 75 mcg tablet TAKE 1 TABLET BY MOUTH DAILY BEFORE BREAKFAST  primidone (MYSOLINE) 50 mg tablet TAKE 1/2 A TABLET BY MOUTH AT BEDTIME FOR 1 WEEK, THEN 1 TABLET EVERY NIGHT AT BEDTIME  triamcinolone acetonide (KENALOG) 0.1 % ointment Apply  to affected area two (2) times a day. use thin layer  metoprolol succinate (TOPROL XL) 50 mg XL tablet Take 1.5 Tabs by mouth daily.  fenofibrate (LOFIBRA) 160 mg tablet TAKE 1 TABLET DAILY  simvastatin (ZOCOR) 40 mg tablet TAKE 1 TABLET NIGHTLY  ondansetron (ZOFRAN ODT) 4 mg disintegrating tablet Take 1 Tab by mouth every eight (8) hours as needed for Nausea.  tadalafil (CIALIS) 5 mg tablet Take 5 mg by mouth as needed.  tamsulosin (FLOMAX) 0.4 mg capsule Take 0.4 mg by mouth daily.  albuterol (PROVENTIL HFA, VENTOLIN HFA, PROAIR HFA) 90 mcg/actuation inhaler INHALE 2 PUFFS BY MOUTH EVERY 6 HOURS AS NEEDED FOR WHEEZING OR SHORTNESS OF BREATH  
 traMADol (ULTRAM) 50 mg tablet Take 50 mg by mouth daily.  diclofenac EC (VOLTAREN) 75 mg EC tablet Take 75 mg by mouth daily.  fluorouracil (EFUDEX) 5 % chemo cream Apply a thin layer twice daily for total of 4 weeks.  amLODIPine (NORVASC) 10 mg tablet Take  by mouth daily.  rivaroxaban (XARELTO) 20 mg tab tablet Take  by mouth daily.  finasteride (PROSCAR) 5 mg tablet Take 5 mg by mouth daily as needed.  lisinopril (PRINIVIL, ZESTRIL) 20 mg tablet Take 20 mg by mouth daily. Allergies Allergen Reactions  Demerol [Meperidine] Other (comments) Duplicate, delete  Demerol [Meperidine] Other (comments) \"passed out\" Review of Systems: A complete review of systems was obtained, negative except as described above. Physical Exam:  
 
Visit Vitals /89 Pulse (!) 127 Temp 98.5 °F (36.9 °C) Resp 24 Ht 5' 9\" (1.753 m) Wt 178 lb 12.7 oz (81.1 kg) SpO2 96% BMI 26.40 kg/m² ECOG PS: 4 General: acutely ill appering Eyes: PERRL, anicteric sclerae HENT: Atraumatic Psych: Alert, oriented, appropriate affect, normal judgment/insight Results:  
 
Lab Results Component Value Date/Time WBC 4.7 03/30/2020 07:52 AM  
 HGB 8.9 (L) 03/30/2020 07:52 AM  
 HCT 26.8 (L) 03/30/2020 07:52 AM  
 PLATELET 245 (L) 16/44/4374 07:52 AM  
 MCV 91.5 03/30/2020 07:52 AM  
 ABS. NEUTROPHILS 3.1 03/30/2020 07:52 AM  
 
Lab Results Component Value Date/Time  Sodium 138 03/30/2020 07:52 AM  
 Potassium 3.8 03/30/2020 07:52 AM  
 Chloride 109 (H) 03/30/2020 07:52 AM  
 CO2 18 (L) 03/30/2020 07:52 AM  
 Glucose 111 (H) 03/30/2020 07:52 AM  
 BUN 37 (H) 03/30/2020 07:52 AM  
 Creatinine 1.49 (H) 03/30/2020 07:52 AM  
 GFR est AA 56 (L) 03/30/2020 07:52 AM  
 GFR est non-AA 46 (L) 03/30/2020 07:52 AM  
 Calcium 7.8 (L) 03/30/2020 07:52 AM  
 
Lab Results Component Value Date/Time Bilirubin, total 1.8 (H) 03/30/2020 07:52 AM  
 ALT (SGPT) 48 03/30/2020 07:52 AM  
 AST (SGOT) 114 (H) 03/30/2020 07:52 AM  
 Alk. phosphatase 120 (H) 03/30/2020 07:52 AM  
 Protein, total 5.7 (L) 03/30/2020 07:52 AM  
 Albumin 1.7 (L) 03/30/2020 07:52 AM  
 Globulin 4.0 03/30/2020 07:52 AM  
 
12/6/19 Abdominal wall mass, excisional biopsy: Melanoma, MRI HEAD 12/16/19 IMPRESSION: 
1. No evidence of intracranial metastatic disease. 2. Mild chronic white matter disease with no acute process. CAT CAP 12/16/19 IMPRESSION: 
1. No evidence of metastatic disease within the chest, abdomen, or pelvis. There 
are presumed postsurgical changes in the subcutaneous fat anterior and inferior 
to the right rib cage, in the region of the previously demonstrated mass by 
ultrasound. 2. Incidentally noted are 2 sub-5 mm right lung pulmonary nodules. 3. Evidence of old healed granulomatous disease, with calcified granulomas in 
the lung, liver, and spleen. 4. Diffuse fatty infiltration of the liver. 5. Small left renal cysts. 6. Diverticulosis of the colon. 7. Left posterior bladder diverticulum. 8. Enlarged prostate. 9. Atherosclerosis of the coronary arteries and aorta. CT C/A/P 3/30/2020 IMPRESSION: 
  
1. CT of the chest demonstrates multiple bilateral pulmonary nodules which are 
new suspicious for metastatic disease. 
  
There is hepatic steatosis. There are 2 small low densities in the liver could 
represent small metastatic lesions. 
  
There is a 2.4 cm mesenteric nodule could represent metastatic disease. 
  
There is suspicion of extensive bony metastatic disease as described above. There is mild compression of superior endplate of L2. 
 
2. There is mild dilatation of the right, transverse, left and proximal sigmoid 
colon with mild wall thickening of the proximal sigmoid and distal left colon. There is slight pericolonic haziness distal left colon and proximal sigmoid 
colon consistent with nonspecific colitis. No free air or drainable fluid 
collection is identified. Results called to the ER. Records reviewed and summarized above. Pathology report(s) reviewed above. Radiology report(s) reviewed above. Assessment:  
1) Melanoma of the right upper abdomen First diagnosed with melanoma of his mid abdomen 12/2017. Breslow depth 0.65 mm. S/p wide excision by Dr. Fran Rizzo, negative right axillary and right inguinal SLN biopsies BRAF testing negative 
  
Now with melanoma of his right upper abdomen s/p excision with clear margins by Dr. Cody Kirkland 12/6/19 CT scan and the MRI of head all negative. 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 Treatment with adjuvant Opdivo 480 mg IV q. 28 days started 1/7/2020 Patient presented 2/24/20 with new lesion on RUQ of surgical site. Evaluated by Dr. Cody Kirkland and underwent excision of lesion 2/28/20. Pathology was positive for melanoma. Margins were unable to be examined. I have asked pathology to see if there were lymphocytes infiltrating his tumor. CT C/A/P today shows possible mets to lung, liver, mesenteric node, and bone. Discussed these results with patient. Discussed hospice/palliative care given rapid progression of disease. His wife is well aware of what is going on and his prognosis 2) Diarrhea Patient has been taking prednisone 60mg daily with no improvement. C-diff, stool WBC pending. On Arvel Behenrye. GI consulted if evidence of colitis by endoscopy then consider Infliximab for Yervoy induced colitis. 3)GI Bleed Occult stool positive. GI consulted. 4) History of BCC Left neck, s/p Electrodesiccation and Curettage, 06/07/12. Recurrent BCC, left lateral neck, Mohs, 02/24/15 Right upper chest, 11/26/19, cleared with shave biopsy Right jawline, 11/26/19, cleared with shave biopsy but narrowly - patient to use 5-Fu BID x3 weeks per dermatology Patient is followed closely by [facility] for skin checks 5) History of SCC Right arm, s/p Electrodesiccation and Curettage, 12/2014 Patient is followed closely by [facility] for skin checks 4) History of bladder cancer Diagnosed in the early 1990s He continues to follow up with urology and has regular cystoscopies His most recent cystoscopy showed an area of concern in his bladder. Plan is for a repeat cystoscopy in 6 weeks per patient 5) History of A.fib 
S/p ablation Management per primary team 
 
6) Hypotension Most likely 2/2 hypovolemia Management per primary team. 
 
7) chronic renal failure 8) elevated liver enzymes 
  
 
I appreciate the opportunity to participate in Doris Lizette Mak tere. supple/symmetric

## 2022-08-31 NOTE — H&P PST ADULT - HISTORY OF PRESENT ILLNESS
55 yo female with preop dx. conductive hearing loss unspecified presents to pre surgical testing.  Pt reports chronic ear infections with some bilateral hearing loss x 1 year.  Pt s/p CT revealing stenosis.  Pt is scheduled for right canalplasty, possible tympanoplasty with split thickness skin graft. 56 yo female with preop dx. conductive hearing loss unspecified presents to pre surgical testing.  Pt reports chronic ear infections with some bilateral hearing loss x 1 year.  Pt s/p CT revealing stenosis.  Pt is s/p left canalplasty, possible tympanoplasty with split thickness skin graft.  Now scheduled for right  canalplasty,  with split thickness skin graft from right arm.

## 2022-08-31 NOTE — H&P PST ADULT - PROBLEM SELECTOR PLAN 1
Scheduled for right  canalplasty,  with split thickness skin graft from right arm on 9/15/22  Written & verbal preop instructions, gi prophylaxis   Pt verbalized good understanding.

## 2022-08-31 NOTE — H&P PST ADULT - NSICDXPASTSURGICALHX_GEN_ALL_CORE_FT
PAST SURGICAL HISTORY:  H/O ovarian cystectomy age 41    History of       PAST SURGICAL HISTORY:  Conductive hearing loss s/p left canalplasty  with skin graft 3/2022    H/O ovarian cystectomy age 41    History of

## 2022-08-31 NOTE — H&P PST ADULT - ENMT COMMENTS
preop dx. conductive hearing loss unspecified presents to pre surgical testing.  Pt reports chronic ear infections with some bilateral hearing loss x 1 year.  Pt s/p CT revealing stenosis.  Pt is scheduled for right canalplasty, possible tympanoplasty with split thickness skin graft. Preop dx conductive hearing loss unspecified refer to hpi

## 2022-09-08 ENCOUNTER — APPOINTMENT (OUTPATIENT)
Dept: OTOLARYNGOLOGY | Facility: CLINIC | Age: 57
End: 2022-09-08

## 2022-09-08 PROCEDURE — 92567 TYMPANOMETRY: CPT

## 2022-09-08 PROCEDURE — 92557 COMPREHENSIVE HEARING TEST: CPT

## 2022-09-08 PROCEDURE — 99213 OFFICE O/P EST LOW 20 MIN: CPT

## 2022-09-14 ENCOUNTER — TRANSCRIPTION ENCOUNTER (OUTPATIENT)
Age: 57
End: 2022-09-14

## 2022-09-14 LAB — SARS-COV-2 N GENE NPH QL NAA+PROBE: NOT DETECTED

## 2022-09-14 NOTE — ASU PATIENT PROFILE, ADULT - NSICDXPASTSURGICALHX_GEN_ALL_CORE_FT
PAST SURGICAL HISTORY:  Conductive hearing loss s/p left canalplasty  with skin graft 3/2022    H/O ovarian cystectomy age 41    History of

## 2022-09-15 ENCOUNTER — TRANSCRIPTION ENCOUNTER (OUTPATIENT)
Age: 57
End: 2022-09-15

## 2022-09-15 ENCOUNTER — RESULT REVIEW (OUTPATIENT)
Age: 57
End: 2022-09-15

## 2022-09-15 ENCOUNTER — APPOINTMENT (OUTPATIENT)
Dept: OTOLARYNGOLOGY | Facility: HOSPITAL | Age: 57
End: 2022-09-15

## 2022-09-15 ENCOUNTER — OUTPATIENT (OUTPATIENT)
Dept: OUTPATIENT SERVICES | Facility: HOSPITAL | Age: 57
LOS: 1 days | Discharge: HOME | End: 2022-09-15

## 2022-09-15 VITALS
DIASTOLIC BLOOD PRESSURE: 72 MMHG | RESPIRATION RATE: 16 BRPM | HEIGHT: 60 IN | OXYGEN SATURATION: 97 % | TEMPERATURE: 98 F | WEIGHT: 139.99 LBS | HEART RATE: 68 BPM | SYSTOLIC BLOOD PRESSURE: 119 MMHG

## 2022-09-15 VITALS
DIASTOLIC BLOOD PRESSURE: 64 MMHG | RESPIRATION RATE: 17 BRPM | OXYGEN SATURATION: 97 % | SYSTOLIC BLOOD PRESSURE: 123 MMHG | HEART RATE: 83 BPM

## 2022-09-15 DIAGNOSIS — H61.303 ACQUIRED STENOSIS OF EXTERNAL EAR CANAL, UNSPECIFIED, BILATERAL: ICD-10-CM

## 2022-09-15 DIAGNOSIS — H90.2 CONDUCTIVE HEARING LOSS, UNSPECIFIED: Chronic | ICD-10-CM

## 2022-09-15 DIAGNOSIS — Z98.891 HISTORY OF UTERINE SCAR FROM PREVIOUS SURGERY: Chronic | ICD-10-CM

## 2022-09-15 DIAGNOSIS — Z98.890 OTHER SPECIFIED POSTPROCEDURAL STATES: Chronic | ICD-10-CM

## 2022-09-15 PROCEDURE — 92516 FACIAL NERVE FUNCTION TEST: CPT | Mod: RT

## 2022-09-15 PROCEDURE — 88304 TISSUE EXAM BY PATHOLOGIST: CPT | Mod: 26

## 2022-09-15 PROCEDURE — 69150 EXTENSIVE EAR CANAL SURGERY: CPT | Mod: RT

## 2022-09-15 PROCEDURE — 15120 SPLT AGRFT F/S/N/H/F/G/M 1ST: CPT | Mod: RT

## 2022-09-15 DEVICE — SURGIFOAM PAD 8CM X 12.5CM X 10MM (100): Type: IMPLANTABLE DEVICE | Status: FUNCTIONAL

## 2022-09-15 RX ORDER — FENTANYL CITRATE 50 UG/ML
50 INJECTION INTRAVENOUS
Refills: 0 | Status: DISCONTINUED | OUTPATIENT
Start: 2022-09-15 | End: 2022-09-15

## 2022-09-15 RX ORDER — OXYCODONE HYDROCHLORIDE 5 MG/1
5 TABLET ORAL ONCE
Refills: 0 | Status: DISCONTINUED | OUTPATIENT
Start: 2022-09-15 | End: 2022-09-15

## 2022-09-15 RX ORDER — ONDANSETRON 8 MG/1
4 TABLET, FILM COATED ORAL ONCE
Refills: 0 | Status: COMPLETED | OUTPATIENT
Start: 2022-09-15 | End: 2022-09-15

## 2022-09-15 RX ORDER — ACETAMINOPHEN 500 MG
650 TABLET ORAL EVERY 6 HOURS
Refills: 0 | Status: DISCONTINUED | OUTPATIENT
Start: 2022-09-15 | End: 2022-09-29

## 2022-09-15 RX ORDER — FENTANYL CITRATE 50 UG/ML
25 INJECTION INTRAVENOUS
Refills: 0 | Status: DISCONTINUED | OUTPATIENT
Start: 2022-09-15 | End: 2022-09-15

## 2022-09-15 RX ORDER — OXYCODONE HYDROCHLORIDE 5 MG/1
1 TABLET ORAL
Qty: 10 | Refills: 0
Start: 2022-09-15

## 2022-09-15 RX ORDER — ACETAMINOPHEN 500 MG
2 TABLET ORAL
Qty: 0 | Refills: 0 | DISCHARGE
Start: 2022-09-15

## 2022-09-15 RX ORDER — CEFDINIR 250 MG/5ML
1 POWDER, FOR SUSPENSION ORAL
Qty: 14 | Refills: 0
Start: 2022-09-15 | End: 2022-09-21

## 2022-09-15 RX ADMIN — ONDANSETRON 4 MILLIGRAM(S): 8 TABLET, FILM COATED ORAL at 10:05

## 2022-09-15 NOTE — ASU DISCHARGE PLAN (ADULT/PEDIATRIC) - CARE PROVIDER_API CALL
Tone Cox)  Otolaryngology  [address] [place] [address]  Phone: [phone]  Fax: [phone]  Follow Up Time:

## 2022-09-15 NOTE — ASU DISCHARGE PLAN (ADULT/PEDIATRIC) - ASU DC SPECIAL INSTRUCTIONSFT
Please keep ear and arm dressings intact and dry    Take antibiotic twice a day as prescribed for 7 days    Follow-up with Dr. Cox in 7 days    For pain take Tylenol for mild to moderate pain, Oxycodone for severe      Refer to printout sheet for post-operative instructions

## 2022-09-15 NOTE — ASU DISCHARGE PLAN (ADULT/PEDIATRIC) - NURSING INSTRUCTIONS
DO NOT take any Tylenol (Acetaminophen) or narcotics containing Tylenol until after 345p . You received Tylenol during your operation and it can cause damage to your liver if too much is taken within a 24 hour time period.

## 2022-09-15 NOTE — ASU DISCHARGE PLAN (ADULT/PEDIATRIC) - NS MD DC FALL RISK RISK
For information on Fall & Injury Prevention, visit: [email or web address] OR  [email or web address] OR  https://www.cdc.gov/steadi/patient.html

## 2022-09-21 ENCOUNTER — APPOINTMENT (OUTPATIENT)
Dept: OTOLARYNGOLOGY | Facility: CLINIC | Age: 57
End: 2022-09-21

## 2022-09-21 PROCEDURE — 99024 POSTOP FOLLOW-UP VISIT: CPT

## 2022-09-22 LAB — SURGICAL PATHOLOGY STUDY: SIGNIFICANT CHANGE UP

## 2022-09-29 NOTE — PHYSICAL EXAM
[Midline] : trachea located in midline position [Normal] : orientation to person, place, and time: normal [FreeTextEntry1] : arm healed [de-identified] : EAC patent, TM visible AS - wax removed AU - blind sac AD

## 2022-09-29 NOTE — DATA REVIEWED
[de-identified] : Left ear: Essentially mild CHL rising to normal hearing thru 3khz with a mild to moderate CHL thereafter\par Right Ear: Essentially moderate/moderately-severe to severe CHL\par Type A tymp in the left ear \par Possible Type B tymp in the right ear

## 2022-09-29 NOTE — PHYSICAL EXAM
[FreeTextEntry1] : arm dressing changed [de-identified] : packing removed - all abelardo silk out - gelfoam secure

## 2022-09-29 NOTE — REASON FOR VISIT
[Post-Operative Visit] : a post-operative visit [FreeTextEntry2] : s/p right Ear canal plasty, skin graft from right arm  09/15/22

## 2022-09-29 NOTE — HISTORY OF PRESENT ILLNESS
[de-identified] : s/p right Ear canal plasty, skin graft from right arm  09/15/22\par States doing well. Denies bleeding, fever, pain or drainage.\par

## 2022-10-05 ENCOUNTER — APPOINTMENT (OUTPATIENT)
Dept: OTOLARYNGOLOGY | Facility: CLINIC | Age: 57
End: 2022-10-05

## 2022-10-05 VITALS
SYSTOLIC BLOOD PRESSURE: 128 MMHG | HEART RATE: 81 BPM | WEIGHT: 140 LBS | HEIGHT: 61 IN | DIASTOLIC BLOOD PRESSURE: 77 MMHG | BODY MASS INDEX: 26.43 KG/M2

## 2022-10-05 PROCEDURE — 99024 POSTOP FOLLOW-UP VISIT: CPT

## 2022-10-05 RX ORDER — OFLOXACIN OTIC 3 MG/ML
0.3 SOLUTION AURICULAR (OTIC) TWICE DAILY
Qty: 1 | Refills: 2 | Status: COMPLETED | COMMUNITY
Start: 2022-09-21 | End: 2022-10-05

## 2022-11-06 NOTE — HISTORY OF PRESENT ILLNESS
[de-identified] : 57 year old woman, follow up s/p Right canaloplasty with split-thickness skin graft from right arm and facial nerve monitoring 9/15/22.  Reports doing well overall but noticed mild bleeding in opposite ear, Left, s/p surgery of that ear March 2022.  History of Acquired stenosis of right external auditory canal with conductive hearing loss. Denies otorrhea, discharge, drainage or fluid from Right ear - No bleeding. Denies recent fevers. No change with heairng

## 2022-11-06 NOTE — PHYSICAL EXAM
[FreeTextEntry1] : arm healing [de-identified] : skin grafts taking AD - reporting bleeding AS - cleaned -

## 2022-11-07 ENCOUNTER — APPOINTMENT (OUTPATIENT)
Dept: OTOLARYNGOLOGY | Facility: CLINIC | Age: 57
End: 2022-11-07

## 2022-11-07 VITALS
HEIGHT: 61 IN | SYSTOLIC BLOOD PRESSURE: 135 MMHG | WEIGHT: 140 LBS | DIASTOLIC BLOOD PRESSURE: 85 MMHG | BODY MASS INDEX: 26.43 KG/M2

## 2022-11-07 PROCEDURE — 92567 TYMPANOMETRY: CPT

## 2022-11-07 PROCEDURE — 92557 COMPREHENSIVE HEARING TEST: CPT

## 2022-11-07 PROCEDURE — 99024 POSTOP FOLLOW-UP VISIT: CPT

## 2022-11-07 RX ORDER — CEFDINIR 300 MG/1
300 CAPSULE ORAL
Qty: 14 | Refills: 0 | Status: COMPLETED | COMMUNITY
Start: 2022-09-15

## 2022-11-07 RX ORDER — OXYCODONE HYDROCHLORIDE 5 MG/1
5 CAPSULE ORAL
Qty: 10 | Refills: 0 | Status: COMPLETED | COMMUNITY
Start: 2022-09-15

## 2022-11-07 RX ORDER — SULFACETAMIDE SODIUM AND PREDNISOLONE SODIUM PHOSPHATE 100; 2.3 MG/ML; MG/ML
10-0.23 SOLUTION/ DROPS OPHTHALMIC
Qty: 4 | Refills: 3 | Status: COMPLETED | COMMUNITY
Start: 2022-10-05 | End: 2022-11-07

## 2022-11-22 NOTE — HISTORY OF PRESENT ILLNESS
[de-identified] : 57 year old female here for follow up s/p Right canaloplasty 9/15/22. Reports hearing improvement. Patient reports doing well. Denies pain, bleeding or fevers.

## 2022-12-05 ENCOUNTER — APPOINTMENT (OUTPATIENT)
Dept: OTOLARYNGOLOGY | Facility: CLINIC | Age: 57
End: 2022-12-05

## 2022-12-05 VITALS
HEIGHT: 61 IN | SYSTOLIC BLOOD PRESSURE: 141 MMHG | BODY MASS INDEX: 26.43 KG/M2 | HEART RATE: 72 BPM | WEIGHT: 140 LBS | DIASTOLIC BLOOD PRESSURE: 90 MMHG

## 2022-12-05 DIAGNOSIS — H90.2 CONDUCTIVE HEARING LOSS, UNSPECIFIED: ICD-10-CM

## 2022-12-05 PROCEDURE — 99213 OFFICE O/P EST LOW 20 MIN: CPT | Mod: 24

## 2022-12-18 PROBLEM — H90.2 TYMPANIC MEMBRANE CONDUCTIVE HEARING LOSS: Status: ACTIVE | Noted: 2022-01-13

## 2022-12-18 NOTE — REASON FOR VISIT
[Subsequent Evaluation] : a subsequent evaluation for [FreeTextEntry2] : s/p Right canaloplasty 9/15/22.

## 2022-12-18 NOTE — HISTORY OF PRESENT ILLNESS
[de-identified] : 57 year old female, following up. S/p s/p Right canaloplasty 9/15/22. Reports hearing is stable since last visit. Continues to use Sulfacetamide daily with relief. Denies otalgia, otorrhea, dizziness of vertigo.

## 2022-12-18 NOTE — PHYSICAL EXAM
[Midline] : trachea located in midline position [Normal] : orientation to person, place, and time: normal [FreeTextEntry1] : arm healing [de-identified] : canals open -

## 2023-03-06 ENCOUNTER — APPOINTMENT (OUTPATIENT)
Dept: OTOLARYNGOLOGY | Facility: CLINIC | Age: 58
End: 2023-03-06
Payer: COMMERCIAL

## 2023-03-06 VITALS
HEIGHT: 61 IN | BODY MASS INDEX: 26.43 KG/M2 | WEIGHT: 140 LBS | SYSTOLIC BLOOD PRESSURE: 146 MMHG | DIASTOLIC BLOOD PRESSURE: 88 MMHG | HEART RATE: 67 BPM

## 2023-03-06 VITALS — WEIGHT: 140 LBS | BODY MASS INDEX: 32.4 KG/M2 | HEIGHT: 55 IN

## 2023-03-06 PROCEDURE — 99214 OFFICE O/P EST MOD 30 MIN: CPT

## 2023-03-06 RX ORDER — SULFACETAMIDE SODIUM AND PREDNISOLONE SODIUM PHOSPHATE 100; 2.3 MG/ML; MG/ML
10-0.23 SOLUTION/ DROPS OPHTHALMIC
Qty: 2 | Refills: 0 | Status: ACTIVE | COMMUNITY
Start: 2023-03-06 | End: 1900-01-01

## 2023-03-16 NOTE — REASON FOR VISIT
[Subsequent Evaluation] : a subsequent evaluation for [FreeTextEntry2] : follow up s/p right canaloplasty 9/15/22

## 2023-03-16 NOTE — HISTORY OF PRESENT ILLNESS
[de-identified] : 57 year old female follow up s/p right canaloplasty 9/15/22.  States feels hearing is a lot better.  States sometimes getting some right otorrhea when laying on her right side, feels wet.  States sometimes occurs in the left ear when laying on the left side.  States sometimes ears feel clogged, when trying clear, they pop, and then the clogged feeling returns.  Denies otalgia.

## 2023-03-16 NOTE — PHYSICAL EXAM
[Midline] : trachea located in midline position [Normal] : orientation to person, place, and time: normal [de-identified] : canals open - but b/l TM granulation - wet

## 2023-04-10 ENCOUNTER — APPOINTMENT (OUTPATIENT)
Dept: OTOLARYNGOLOGY | Facility: CLINIC | Age: 58
End: 2023-04-10

## 2023-08-22 ENCOUNTER — NON-APPOINTMENT (OUTPATIENT)
Age: 58
End: 2023-08-22

## 2023-09-18 ENCOUNTER — APPOINTMENT (OUTPATIENT)
Dept: OTOLARYNGOLOGY | Facility: CLINIC | Age: 58
End: 2023-09-18
Payer: COMMERCIAL

## 2023-09-18 DIAGNOSIS — H73.23: ICD-10-CM

## 2023-09-18 PROCEDURE — 92504 EAR MICROSCOPY EXAMINATION: CPT

## 2023-09-18 PROCEDURE — 99214 OFFICE O/P EST MOD 30 MIN: CPT

## 2023-09-18 RX ORDER — TOBRAMYCIN AND DEXAMETHASONE 3; 1 MG/ML; MG/ML
0.3-0.1 SUSPENSION/ DROPS OPHTHALMIC
Qty: 2 | Refills: 0 | Status: ACTIVE | COMMUNITY
Start: 2023-09-18 | End: 1900-01-01

## 2023-09-20 PROBLEM — H73.23: Status: ACTIVE | Noted: 2021-12-02

## 2023-09-21 ENCOUNTER — APPOINTMENT (OUTPATIENT)
Age: 58
End: 2023-09-21
Payer: COMMERCIAL

## 2023-09-21 PROCEDURE — 99203 OFFICE O/P NEW LOW 30 MIN: CPT

## 2023-10-19 ENCOUNTER — APPOINTMENT (OUTPATIENT)
Age: 58
End: 2023-10-19
Payer: COMMERCIAL

## 2023-10-19 PROCEDURE — 99213 OFFICE O/P EST LOW 20 MIN: CPT

## 2023-10-31 ENCOUNTER — NON-APPOINTMENT (OUTPATIENT)
Age: 58
End: 2023-10-31

## 2023-11-01 ENCOUNTER — APPOINTMENT (OUTPATIENT)
Dept: OTOLARYNGOLOGY | Facility: CLINIC | Age: 58
End: 2023-11-01
Payer: COMMERCIAL

## 2023-11-01 DIAGNOSIS — H69.93 UNSPECIFIED EUSTACHIAN TUBE DISORDER, BILATERAL: ICD-10-CM

## 2023-11-01 DIAGNOSIS — H61.303 ACQUIRED STENOSIS OF EXTERNAL EAR CANAL, UNSPECIFIED, BILATERAL: ICD-10-CM

## 2023-11-01 DIAGNOSIS — L92.9 GRANULOMATOUS DISORDER OF THE SKIN AND SUBCUTANEOUS TISSUE, UNSPECIFIED: ICD-10-CM

## 2023-11-01 PROCEDURE — 99213 OFFICE O/P EST LOW 20 MIN: CPT

## 2023-11-01 RX ORDER — FLUTICASONE PROPIONATE 50 UG/1
50 SPRAY, METERED NASAL
Qty: 3 | Refills: 3 | Status: ACTIVE | COMMUNITY
Start: 2023-11-01 | End: 1900-01-01

## 2023-11-06 ENCOUNTER — APPOINTMENT (OUTPATIENT)
Dept: OTOLARYNGOLOGY | Facility: CLINIC | Age: 58
End: 2023-11-06

## 2023-11-10 PROBLEM — L92.9 GRANULATION TISSUE OF EAR CANAL: Status: ACTIVE | Noted: 2022-01-21

## 2023-11-10 PROBLEM — H61.303 ACQUIRED STENOSIS OF BOTH EXTERNAL EAR CANALS: Status: ACTIVE | Noted: 2022-01-13

## 2023-11-16 ENCOUNTER — APPOINTMENT (OUTPATIENT)
Age: 58
End: 2023-11-16
Payer: COMMERCIAL

## 2023-11-16 PROCEDURE — 99213 OFFICE O/P EST LOW 20 MIN: CPT

## 2023-11-26 ENCOUNTER — TRANSCRIPTION ENCOUNTER (OUTPATIENT)
Age: 58
End: 2023-11-26

## 2024-04-03 ENCOUNTER — APPOINTMENT (OUTPATIENT)
Dept: OTOLARYNGOLOGY | Facility: CLINIC | Age: 59
End: 2024-04-03

## 2024-06-20 ENCOUNTER — APPOINTMENT (OUTPATIENT)
Age: 59
End: 2024-06-20
Payer: COMMERCIAL

## 2024-06-20 PROCEDURE — 99213 OFFICE O/P EST LOW 20 MIN: CPT

## 2024-08-01 ENCOUNTER — APPOINTMENT (OUTPATIENT)
Age: 59
End: 2024-08-01

## 2024-10-28 ENCOUNTER — APPOINTMENT (OUTPATIENT)
Dept: OBGYN | Facility: CLINIC | Age: 59
End: 2024-10-28
Payer: COMMERCIAL

## 2024-10-28 VITALS
BODY MASS INDEX: 23.98 KG/M2 | SYSTOLIC BLOOD PRESSURE: 130 MMHG | DIASTOLIC BLOOD PRESSURE: 80 MMHG | WEIGHT: 127 LBS | HEIGHT: 61 IN

## 2024-10-28 DIAGNOSIS — Z78.9 OTHER SPECIFIED HEALTH STATUS: ICD-10-CM

## 2024-10-28 DIAGNOSIS — Z80.3 FAMILY HISTORY OF MALIGNANT NEOPLASM OF BREAST: ICD-10-CM

## 2024-10-28 DIAGNOSIS — E11.9 TYPE 2 DIABETES MELLITUS W/OUT COMPLICATIONS: ICD-10-CM

## 2024-10-28 DIAGNOSIS — Z12.4 ENCOUNTER FOR SCREENING FOR MALIGNANT NEOPLASM OF CERVIX: ICD-10-CM

## 2024-10-28 DIAGNOSIS — M35.00 SICCA SYNDROME, UNSPECIFIED: ICD-10-CM

## 2024-10-28 DIAGNOSIS — Z12.39 ENCOUNTER FOR OTHER SCREENING FOR MALIGNANT NEOPLASM OF BREAST: ICD-10-CM

## 2024-10-28 DIAGNOSIS — N60.11 DIFFUSE CYSTIC MASTOPATHY OF RIGHT BREAST: ICD-10-CM

## 2024-10-28 DIAGNOSIS — N60.12 DIFFUSE CYSTIC MASTOPATHY OF RIGHT BREAST: ICD-10-CM

## 2024-10-28 DIAGNOSIS — Z01.419 ENCOUNTER FOR GYNECOLOGICAL EXAMINATION (GENERAL) (ROUTINE) W/OUT ABNORMAL FINDINGS: ICD-10-CM

## 2024-10-28 PROCEDURE — 99386 PREV VISIT NEW AGE 40-64: CPT

## 2024-10-28 RX ORDER — METFORMIN HYDROCHLORIDE 500 MG/1
500 TABLET, COATED ORAL
Refills: 0 | Status: ACTIVE | COMMUNITY

## 2024-10-28 RX ORDER — ROSUVASTATIN CALCIUM 5 MG/1
5 TABLET, FILM COATED ORAL
Refills: 0 | Status: ACTIVE | COMMUNITY

## 2024-10-30 LAB — CYTOLOGY CVX/VAG DOC THIN PREP: ABNORMAL

## 2025-04-23 ENCOUNTER — APPOINTMENT (OUTPATIENT)
Dept: OTOLARYNGOLOGY | Facility: CLINIC | Age: 60
End: 2025-04-23

## 2025-04-23 VITALS
BODY MASS INDEX: 23.98 KG/M2 | SYSTOLIC BLOOD PRESSURE: 136 MMHG | HEIGHT: 61 IN | HEART RATE: 67 BPM | DIASTOLIC BLOOD PRESSURE: 82 MMHG | WEIGHT: 127 LBS

## 2025-04-23 DIAGNOSIS — H60.312 DIFFUSE OTITIS EXTERNA, LEFT EAR: ICD-10-CM

## 2025-04-23 PROCEDURE — 92504 EAR MICROSCOPY EXAMINATION: CPT

## 2025-04-23 PROCEDURE — 99214 OFFICE O/P EST MOD 30 MIN: CPT

## 2025-04-23 RX ORDER — CIPROFLOXACIN AND DEXAMETHASONE 3; 1 MG/ML; MG/ML
0.3-0.1 SUSPENSION/ DROPS AURICULAR (OTIC)
Qty: 1 | Refills: 3 | Status: ACTIVE | COMMUNITY
Start: 2025-04-23 | End: 1900-01-01

## 2025-05-12 ENCOUNTER — APPOINTMENT (OUTPATIENT)
Dept: OTOLARYNGOLOGY | Facility: CLINIC | Age: 60
End: 2025-05-12
Payer: COMMERCIAL

## 2025-05-12 VITALS — BODY MASS INDEX: 23.98 KG/M2 | WEIGHT: 127 LBS | HEIGHT: 61 IN

## 2025-05-12 DIAGNOSIS — H66.92 OTITIS MEDIA, UNSPECIFIED, LEFT EAR: ICD-10-CM

## 2025-05-12 PROCEDURE — 92557 COMPREHENSIVE HEARING TEST: CPT

## 2025-05-12 PROCEDURE — 92567 TYMPANOMETRY: CPT

## 2025-05-12 PROCEDURE — 99214 OFFICE O/P EST MOD 30 MIN: CPT

## 2025-05-12 PROCEDURE — 92504 EAR MICROSCOPY EXAMINATION: CPT

## 2025-05-12 RX ORDER — METHYLPREDNISOLONE 4 MG/1
4 TABLET ORAL
Qty: 1 | Refills: 0 | Status: ACTIVE | COMMUNITY
Start: 2025-05-12 | End: 1900-01-01

## 2025-05-12 RX ORDER — CEFDINIR 300 MG/1
300 CAPSULE ORAL
Qty: 20 | Refills: 0 | Status: ACTIVE | COMMUNITY
Start: 2025-05-12 | End: 1900-01-01

## 2025-07-14 ENCOUNTER — APPOINTMENT (OUTPATIENT)
Dept: OTOLARYNGOLOGY | Facility: CLINIC | Age: 60
End: 2025-07-14
Payer: COMMERCIAL

## 2025-07-14 VITALS — HEIGHT: 61 IN | BODY MASS INDEX: 23.98 KG/M2 | WEIGHT: 127 LBS

## 2025-07-14 PROBLEM — H92.12 OTORRHEA, LEFT EAR: Status: ACTIVE | Noted: 2025-07-14

## 2025-07-14 PROBLEM — H90.2 CONDUCTIVE HEARING LOSS DUE TO DISORDER OF MIDDLE EAR: Status: ACTIVE | Noted: 2025-07-14

## 2025-07-14 PROCEDURE — 99214 OFFICE O/P EST MOD 30 MIN: CPT

## 2025-07-22 ENCOUNTER — OUTPATIENT (OUTPATIENT)
Dept: OUTPATIENT SERVICES | Facility: HOSPITAL | Age: 60
LOS: 1 days | End: 2025-07-22
Payer: COMMERCIAL

## 2025-07-22 ENCOUNTER — APPOINTMENT (OUTPATIENT)
Dept: CT IMAGING | Facility: CLINIC | Age: 60
End: 2025-07-22
Payer: COMMERCIAL

## 2025-07-22 DIAGNOSIS — H61.303 ACQUIRED STENOSIS OF EXTERNAL EAR CANAL, UNSPECIFIED, BILATERAL: ICD-10-CM

## 2025-07-22 DIAGNOSIS — H69.93 UNSPECIFIED EUSTACHIAN TUBE DISORDER, BILATERAL: ICD-10-CM

## 2025-07-22 DIAGNOSIS — Z98.891 HISTORY OF UTERINE SCAR FROM PREVIOUS SURGERY: Chronic | ICD-10-CM

## 2025-07-22 DIAGNOSIS — Z98.890 OTHER SPECIFIED POSTPROCEDURAL STATES: Chronic | ICD-10-CM

## 2025-07-22 DIAGNOSIS — H90.2 CONDUCTIVE HEARING LOSS, UNSPECIFIED: Chronic | ICD-10-CM

## 2025-07-22 PROCEDURE — 70480 CT ORBIT/EAR/FOSSA W/O DYE: CPT

## 2025-07-22 PROCEDURE — 70480 CT ORBIT/EAR/FOSSA W/O DYE: CPT | Mod: 26

## 2025-07-25 LAB — EAR NOSE AND THROAT CULTURE: ABNORMAL

## 2025-08-04 RX ORDER — SULFAMETHOXAZOLE AND TRIMETHOPRIM 800; 160 MG/1; MG/1
800-160 TABLET ORAL TWICE DAILY
Qty: 14 | Refills: 0 | Status: ACTIVE | COMMUNITY
Start: 2025-08-04 | End: 1900-01-01

## (undated) DEVICE — DRSG STERISTRIPS 0.5 X 4"

## (undated) DEVICE — ELCTR MONOPOLAR STIMULATOR PROBE FLUSH-TIP

## (undated) DEVICE — SOL IRR POUR H2O 500ML

## (undated) DEVICE — DRILL BIT ANSPACH DIAMOND BALL 3MMX5CM

## (undated) DEVICE — ZIMMER BLADE DERMATONE

## (undated) DEVICE — NDL BLD BEAVER CUTTING EDGE 3.0MM

## (undated) DEVICE — DRAPE MICROSCOPE ZEISS W CLEARLENS

## (undated) DEVICE — DRAIN PENROSE .5" X 18" LATEX

## (undated) DEVICE — PACK MASTOID/MIDDLE EAR

## (undated) DEVICE — ELCTR GROUNDING PAD ADULT COVIDIEN

## (undated) DEVICE — SUT CHROMIC 3-0 27" RB-1

## (undated) DEVICE — DRAPE TOWEL BLUE 17" X 24"

## (undated) DEVICE — SUT MONOCRYL 4-0 18" P-3 UNDYED

## (undated) DEVICE — LABELS BLANK W PEN

## (undated) DEVICE — DRAPE INSTRUMENT POUCH 6.75" X 11"

## (undated) DEVICE — DRSG PELLET

## (undated) DEVICE — DRAPE MICROSCOPE OPMI VISIONGUARD 48X118"

## (undated) DEVICE — ELCTR BOVIE TIP BLADE INSULATED 2.75" EDGE

## (undated) DEVICE — WARMING BLANKET FULL ADULT

## (undated) DEVICE — SUT PLAIN GUT FAST ABSORBING 5-0 PC-1

## (undated) DEVICE — DRAPE SPLIT SHEET 77" X 120"

## (undated) DEVICE — DRSG TELFA 3 X 8

## (undated) DEVICE — CONN FEMALE LUER ADAPTOR SM XMAS TREE

## (undated) DEVICE — MARKING PEN W RULER

## (undated) DEVICE — Device

## (undated) DEVICE — BLADE TYMPANOPLASTY 2.5MM W 60 DEGREE BEVEL DOWN

## (undated) DEVICE — FOLEY CATH 2-WAY 22FR 5CC UNCOATED SILICONE

## (undated) DEVICE — DRSG XEROFORM 1 X 8"

## (undated) DEVICE — COTTONBALL LG

## (undated) DEVICE — CANISTER DISPOSABLE THIN WALL 3000CC

## (undated) DEVICE — DRSG MASTISOL

## (undated) DEVICE — POSITIONER PINK PAD PIGAZZI SYSTEM

## (undated) DEVICE — ELCTR NDL SUBDERMAL 2 CHANNEL

## (undated) DEVICE — SPONGE GAUZE 2 X 2" STERILE

## (undated) DEVICE — DRAPE MAYO STAND 30"

## (undated) DEVICE — DRSG STOCKINETTE IMPERVIOUS LG

## (undated) DEVICE — GLV 7 PROTEXIS (WHITE)

## (undated) DEVICE — VENODYNE/SCD SLEEVE CALF MEDIUM